# Patient Record
Sex: MALE | Race: WHITE | NOT HISPANIC OR LATINO | Employment: FULL TIME | URBAN - METROPOLITAN AREA
[De-identification: names, ages, dates, MRNs, and addresses within clinical notes are randomized per-mention and may not be internally consistent; named-entity substitution may affect disease eponyms.]

---

## 2020-09-24 ENCOUNTER — APPOINTMENT (OUTPATIENT)
Dept: GENERAL RADIOLOGY | Facility: HOSPITAL | Age: 25
End: 2020-09-24

## 2020-09-24 ENCOUNTER — HOSPITAL ENCOUNTER (EMERGENCY)
Facility: HOSPITAL | Age: 25
Discharge: HOME | End: 2020-09-24
Admitting: EMERGENCY MEDICINE

## 2020-09-24 VITALS
SYSTOLIC BLOOD PRESSURE: 120 MMHG | RESPIRATION RATE: 18 BRPM | OXYGEN SATURATION: 98 % | DIASTOLIC BLOOD PRESSURE: 68 MMHG | WEIGHT: 145.28 LBS | TEMPERATURE: 98 F | HEART RATE: 82 BPM | BODY MASS INDEX: 20.34 KG/M2 | HEIGHT: 71 IN

## 2020-09-24 DIAGNOSIS — M54.2 NECK PAIN: Primary | ICD-10-CM

## 2020-09-24 DIAGNOSIS — M89.8X1 PAIN OF LEFT SCAPULA: ICD-10-CM

## 2020-09-24 DIAGNOSIS — S16.1XXA STRAIN OF NECK MUSCLE, INITIAL ENCOUNTER: ICD-10-CM

## 2020-09-24 DIAGNOSIS — S40.012A CONTUSION OF LEFT SCAPULA, INITIAL ENCOUNTER: ICD-10-CM

## 2020-09-24 PROCEDURE — 99283 EMERGENCY DEPT VISIT LOW MDM: CPT

## 2020-09-24 PROCEDURE — 96372 THER/PROPH/DIAG INJ SC/IM: CPT

## 2020-09-24 PROCEDURE — 73010 X-RAY EXAM OF SHOULDER BLADE: CPT

## 2020-09-24 PROCEDURE — 25010000002 KETOROLAC TROMETHAMINE PER 15 MG: Performed by: PHYSICIAN ASSISTANT

## 2020-09-24 PROCEDURE — 72050 X-RAY EXAM NECK SPINE 4/5VWS: CPT

## 2020-09-24 RX ORDER — METHOCARBAMOL 750 MG/1
750 TABLET, FILM COATED ORAL 3 TIMES DAILY PRN
Qty: 15 TABLET | Refills: 0 | OUTPATIENT
Start: 2020-09-24 | End: 2023-01-18

## 2020-09-24 RX ORDER — LIDOCAINE 50 MG/G
1 PATCH TOPICAL ONCE
Status: DISCONTINUED | OUTPATIENT
Start: 2020-09-24 | End: 2020-09-24 | Stop reason: HOSPADM

## 2020-09-24 RX ORDER — KETOROLAC TROMETHAMINE 30 MG/ML
30 INJECTION, SOLUTION INTRAMUSCULAR; INTRAVENOUS ONCE
Status: COMPLETED | OUTPATIENT
Start: 2020-09-24 | End: 2020-09-24

## 2020-09-24 RX ORDER — NAPROXEN 500 MG/1
500 TABLET ORAL 2 TIMES DAILY WITH MEALS
Qty: 20 TABLET | Refills: 0 | OUTPATIENT
Start: 2020-09-24 | End: 2023-01-18

## 2020-09-24 RX ORDER — METHOCARBAMOL 750 MG/1
750 TABLET, FILM COATED ORAL ONCE
Status: COMPLETED | OUTPATIENT
Start: 2020-09-24 | End: 2020-09-24

## 2020-09-24 RX ORDER — LIDOCAINE 50 MG/G
1 PATCH TOPICAL EVERY 24 HOURS
Qty: 6 EACH | Refills: 0 | OUTPATIENT
Start: 2020-09-24 | End: 2023-01-18

## 2020-09-24 RX ADMIN — METHOCARBAMOL TABLETS 750 MG: 750 TABLET, COATED ORAL at 07:26

## 2020-09-24 RX ADMIN — KETOROLAC TROMETHAMINE 30 MG: 30 INJECTION, SOLUTION INTRAMUSCULAR at 07:26

## 2020-09-24 RX ADMIN — LIDOCAINE 1 PATCH: 50 PATCH TOPICAL at 07:26

## 2020-09-24 NOTE — DISCHARGE INSTRUCTIONS
Use naproxen and Robaxin as needed for pain.  Do not mix naproxen with other NSAID such as ibuprofen diclofenac or Aleve.  You may also use lidocaine patch as needed for pain.  Do not apply heating pad over this patch.    Follow-up with your primary care provider in 3-5 days.  If you do not have a primary care provider call [phone] for help in finding one, or you may follow up with [facility] at [phone].    Return to ED for any new or worsening symptoms.    No heavy lifting for the next 5 days.

## 2020-09-24 NOTE — ED PROVIDER NOTES
Subjective   Patient is a 24-year-old male [facility] significant for arthritis who presents with complaints of left scapula and neck pain started around 6:30 AM at work.  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  Patient states healing at bedside the patient mostly along the left side.  Patient denies hitting his head or LOC at time of the incident.  Patient is complaining of neck and left scapula pain that radiates into his left arm at times.  Patient does report some paresthesias of his right arm but denies any weakness.  Patient describes the pain as an intermittent sharp stabbing type pain that he rates an 8/10 severity.  Patient states the pain is worse with certain movements better with rest.  Patient denies any recent surgeries to the area.  Patient also denies taking any medications.  Patient denies any headache, tenderness or nasal drainage facial droop, chest pain or shortness of breath          Review of Systems   Constitutional: Negative.    Respiratory: Negative.    Cardiovascular: Negative.    Gastrointestinal: Negative for abdominal pain, nausea and vomiting.   Musculoskeletal: Positive for arthralgias, myalgias and neck pain. Negative for gait problem, joint swelling and neck stiffness.   Skin: Negative.    Neurological: Negative.        History reviewed. No pertinent past medical history.    Allergies   Allergen Reactions   • Erythromycin Hives       History reviewed. No pertinent surgical history.    No family history on file.    Social History     Socioeconomic History   • Marital status: Single     Spouse name: Not on file   • Number of children: Not on file   • Years of education: Not on file   • Highest education level: Not on file           Objective   Physical Exam  Vitals signs and nursing note reviewed.   Constitutional:       General: He is not in acute distress.     Appearance: He is  "well-developed. He is not ill-appearing, toxic-appearing or diaphoretic.   HENT:      Head: Normocephalic and atraumatic.      Mouth/Throat:      Pharynx: No oropharyngeal exudate.   Eyes:      General: No scleral icterus.     Pupils: Pupils are equal, round, and reactive to light.   Neck:      Musculoskeletal: Decreased range of motion. Pain with movement and muscular tenderness present. No edema, erythema or neck rigidity.      Trachea: Trachea normal.   Cardiovascular:      Rate and Rhythm: Normal rate and regular rhythm.      Heart sounds: Normal heart sounds. No murmur. No friction rub. No gallop.    Pulmonary:      Effort: Pulmonary effort is normal. No respiratory distress.      Breath sounds: Normal breath sounds. No stridor. No wheezing, rhonchi or rales.   Chest:      Chest wall: No tenderness.   Musculoskeletal:      Left shoulder: He exhibits decreased range of motion and tenderness. He exhibits no bony tenderness, no swelling, no crepitus, no deformity, no laceration, no pain, no spasm, normal pulse and normal strength.        Arms:       Comments: Radial medial ulnar and axillary nerve intact normal sensation and strength bilaterally.  Peripheral pulses are intact and compartments are soft of upper extremities bilaterally     Lymphadenopathy:      Cervical: No cervical adenopathy.   Skin:     General: Skin is warm.      Capillary Refill: Capillary refill takes less than 2 seconds.      Findings: No bruising or rash.   Neurological:      General: No focal deficit present.      Mental Status: He is alert and oriented to person, place, and time.      Cranial Nerves: No cranial nerve deficit.      Sensory: No sensory deficit.   Psychiatric:         Mood and Affect: Mood normal.         Behavior: Behavior normal.         Procedures           ED Course    /68 (BP Location: Right arm, Patient Position: Sitting)   Pulse 82   Temp 98 °F (36.7 °C) (Oral)   Resp 18   Ht 180.3 cm (71\")   Wt 65.9 kg (145 " lb 4.5 oz)   SpO2 98%   BMI 20.26 kg/m²   Medications   lidocaine (LIDODERM) 5 % 1 patch (1 patch Transdermal Medication Applied 9/24/20 0726)   ketorolac (TORADOL) injection 30 mg (30 mg Intramuscular Given 9/24/20 0726)   methocarbamol (ROBAXIN) tablet 750 mg (750 mg Oral Given 9/24/20 0726)     Labs Reviewed - No data to display  Xr Spine Cervical Complete 4 Or 5 View    Result Date: 9/24/2020  Normal radiographic exam of the cervical spine.  Electronically Signed By-Samra Benedict MD On:9/24/2020 7:52 AM This report was finalized on [id number] by  Samra Benedict MD.    Xr Scapula Left    Result Date: 9/24/2020  1.No acute osseous abnormality.  Electronically Signed By-Ruiz Atkins On:9/24/2020 7:51 AM This report was finalized on [id number] by  Ruiz Atkins, .                                           MDM  Number of Diagnoses or Management Options  Contusion of left scapula, initial encounter:   Neck pain:   Pain of left scapula:   Strain of neck muscle, initial encounter:   Diagnosis management comments: Chart Review:  Comorbidity: Arthritis  Differentials: Sprain strain fracture;this list is not all inclusive and does not constitute the entirety of considered causes  Imaging: Was interpreted by physician and reviewed by myself:  Xr Spine Cervical Complete 4 Or 5 View  Result Date: 9/24/2020  Normal radiographic exam of the cervical spine.  Electronically Signed By-Samra Benedict MD On:9/24/2020 7:52 AM This report was finalized on [id number] by  Samra Benedict MD.    Xr Scapula Left  Result Date: 9/24/2020  1.No acute osseous abnormality.  Electronically Signed ByAlbino Atkins On:9/24/2020 7:51 AM This report was finalized on [id number] by  Ruiz Atkins, .    Disposition/Treatment:  Appropriate PPE was worn during exam and throughout all encounters with the patient.  While in the ED patient was afebrile.  Nontoxic there are no signs of secondary infections or lacerations noted.  He was given Toradol Robaxin  and lidocaine patch for his pain.  Upon reassessment patient is resting quietly does report improvement of his pain.  X-ray of cervical spine and scapula showed no acute osseous abnormalities as above.  Patient symptoms likely secondary to contusion and strain.  Patient be given naproxen Robaxin and lidocaine patches for home he was advised to do no heavy lifting for the next 5 days he was also given a work note. Findings were discussed with the patient  who voiced understanding of discharge instructions along with signs and symptoms requiring return to the ED.  Upon discharged patient was in stable condition with followup for a revaluation.        Amount and/or Complexity of Data Reviewed  Tests in the radiology section of CPT®: reviewed        Final diagnoses:   Neck pain   Strain of neck muscle, initial encounter   Pain of left scapula   Contusion of left scapula, initial encounter            Maru Hill PA  09/24/20 0819

## 2021-01-19 ENCOUNTER — HOSPITAL ENCOUNTER (EMERGENCY)
Facility: HOSPITAL | Age: 26
Discharge: HOME | End: 2021-01-19
Attending: EMERGENCY MEDICINE | Admitting: EMERGENCY MEDICINE

## 2021-01-19 VITALS
SYSTOLIC BLOOD PRESSURE: 132 MMHG | BODY MASS INDEX: 21 KG/M2 | TEMPERATURE: 98.7 F | RESPIRATION RATE: 16 BRPM | OXYGEN SATURATION: 98 % | DIASTOLIC BLOOD PRESSURE: 79 MMHG | HEART RATE: 89 BPM | WEIGHT: 150 LBS | HEIGHT: 71 IN

## 2021-01-19 DIAGNOSIS — F22 PARANOIA (HCC): Primary | ICD-10-CM

## 2021-01-19 DIAGNOSIS — F15.10 METHAMPHETAMINE USE (HCC): ICD-10-CM

## 2021-01-19 LAB
ALBUMIN SERPL-MCNC: 5.2 G/DL (ref 3.5–5.2)
ALBUMIN/GLOB SERPL: 2 G/DL
ALP SERPL-CCNC: 57 U/L (ref 39–117)
ALT SERPL W P-5'-P-CCNC: 37 U/L (ref 1–41)
AMPHET+METHAMPHET UR QL: POSITIVE
ANION GAP SERPL CALCULATED.3IONS-SCNC: 14 MMOL/L (ref 5–15)
APAP SERPL-MCNC: <5 MCG/ML (ref 0–30)
AST SERPL-CCNC: 51 U/L (ref 1–40)
B PARAPERT DNA SPEC QL NAA+PROBE: NOT DETECTED
B PERT DNA SPEC QL NAA+PROBE: NOT DETECTED
BARBITURATES UR QL SCN: NEGATIVE
BASOPHILS # BLD AUTO: 0.1 10*3/MM3 (ref 0–0.2)
BASOPHILS NFR BLD AUTO: 0.6 % (ref 0–1.5)
BENZODIAZ UR QL SCN: NEGATIVE
BILIRUB SERPL-MCNC: 1.2 MG/DL (ref 0–1.2)
BUN SERPL-MCNC: 14 MG/DL (ref 6–20)
BUN/CREAT SERPL: 17.3 (ref 7–25)
C PNEUM DNA NPH QL NAA+NON-PROBE: NOT DETECTED
CALCIUM SPEC-SCNC: 9.9 MG/DL (ref 8.6–10.5)
CANNABINOIDS SERPL QL: POSITIVE
CHLORIDE SERPL-SCNC: 96 MMOL/L (ref 98–107)
CK SERPL-CCNC: 491 U/L (ref 20–200)
CO2 SERPL-SCNC: 26 MMOL/L (ref 22–29)
COCAINE UR QL: NEGATIVE
CREAT SERPL-MCNC: 0.81 MG/DL (ref 0.76–1.27)
DEPRECATED RDW RBC AUTO: 40.7 FL (ref 37–54)
EOSINOPHIL # BLD AUTO: 0.2 10*3/MM3 (ref 0–0.4)
EOSINOPHIL NFR BLD AUTO: 2 % (ref 0.3–6.2)
ERYTHROCYTE [DISTWIDTH] IN BLOOD BY AUTOMATED COUNT: 12.5 % (ref 12.3–15.4)
ETHANOL UR QL: <0.01 %
FLUAV SUBTYP SPEC NAA+PROBE: NOT DETECTED
FLUBV RNA ISLT QL NAA+PROBE: NOT DETECTED
GFR SERPL CREATININE-BSD FRML MDRD: 115 ML/MIN/1.73
GLOBULIN UR ELPH-MCNC: 2.6 GM/DL
GLUCOSE SERPL-MCNC: 112 MG/DL (ref 65–99)
HADV DNA SPEC NAA+PROBE: NOT DETECTED
HCOV 229E RNA SPEC QL NAA+PROBE: NOT DETECTED
HCOV HKU1 RNA SPEC QL NAA+PROBE: NOT DETECTED
HCOV NL63 RNA SPEC QL NAA+PROBE: NOT DETECTED
HCOV OC43 RNA SPEC QL NAA+PROBE: NOT DETECTED
HCT VFR BLD AUTO: 41.6 % (ref 37.5–51)
HGB BLD-MCNC: 14.7 G/DL (ref 13–17.7)
HMPV RNA NPH QL NAA+NON-PROBE: NOT DETECTED
HOLD SPECIMEN: NORMAL
HPIV1 RNA SPEC QL NAA+PROBE: NOT DETECTED
HPIV2 RNA SPEC QL NAA+PROBE: NOT DETECTED
HPIV3 RNA NPH QL NAA+PROBE: NOT DETECTED
HPIV4 P GENE NPH QL NAA+PROBE: NOT DETECTED
LYMPHOCYTES # BLD AUTO: 1.4 10*3/MM3 (ref 0.7–3.1)
LYMPHOCYTES NFR BLD AUTO: 13.9 % (ref 19.6–45.3)
M PNEUMO IGG SER IA-ACNC: NOT DETECTED
MAGNESIUM SERPL-MCNC: 1.8 MG/DL (ref 1.6–2.6)
MCH RBC QN AUTO: 33.1 PG (ref 26.6–33)
MCHC RBC AUTO-ENTMCNC: 35.4 G/DL (ref 31.5–35.7)
MCV RBC AUTO: 93.4 FL (ref 79–97)
METHADONE UR QL SCN: NEGATIVE
MONOCYTES # BLD AUTO: 0.8 10*3/MM3 (ref 0.1–0.9)
MONOCYTES NFR BLD AUTO: 8.4 % (ref 5–12)
NEUTROPHILS NFR BLD AUTO: 7.3 10*3/MM3 (ref 1.7–7)
NEUTROPHILS NFR BLD AUTO: 75.1 % (ref 42.7–76)
NRBC BLD AUTO-RTO: 0.2 /100 WBC (ref 0–0.2)
OPIATES UR QL: NEGATIVE
OXYCODONE UR QL SCN: NEGATIVE
PLATELET # BLD AUTO: 192 10*3/MM3 (ref 140–450)
PMV BLD AUTO: 7.6 FL (ref 6–12)
POTASSIUM SERPL-SCNC: 3.9 MMOL/L (ref 3.5–5.2)
PROT SERPL-MCNC: 7.8 G/DL (ref 6–8.5)
RBC # BLD AUTO: 4.45 10*6/MM3 (ref 4.14–5.8)
RHINOVIRUS RNA SPEC NAA+PROBE: NOT DETECTED
RSV RNA NPH QL NAA+NON-PROBE: NOT DETECTED
SALICYLATES SERPL-MCNC: <0.3 MG/DL
SARS-COV-2 RNA NPH QL NAA+NON-PROBE: NOT DETECTED
SODIUM SERPL-SCNC: 136 MMOL/L (ref 136–145)
TSH SERPL DL<=0.05 MIU/L-ACNC: 3.93 UIU/ML (ref 0.27–4.2)
WBC # BLD AUTO: 9.7 10*3/MM3 (ref 3.4–10.8)
WHOLE BLOOD HOLD SPECIMEN: NORMAL

## 2021-01-19 PROCEDURE — 80307 DRUG TEST PRSMV CHEM ANLYZR: CPT | Performed by: EMERGENCY MEDICINE

## 2021-01-19 PROCEDURE — 85025 COMPLETE CBC W/AUTO DIFF WBC: CPT | Performed by: EMERGENCY MEDICINE

## 2021-01-19 PROCEDURE — 0202U NFCT DS 22 TRGT SARS-COV-2: CPT | Performed by: EMERGENCY MEDICINE

## 2021-01-19 PROCEDURE — 80053 COMPREHEN METABOLIC PANEL: CPT | Performed by: EMERGENCY MEDICINE

## 2021-01-19 PROCEDURE — 80179 DRUG ASSAY SALICYLATE: CPT | Performed by: EMERGENCY MEDICINE

## 2021-01-19 PROCEDURE — 82550 ASSAY OF CK (CPK): CPT | Performed by: EMERGENCY MEDICINE

## 2021-01-19 PROCEDURE — 80143 DRUG ASSAY ACETAMINOPHEN: CPT | Performed by: EMERGENCY MEDICINE

## 2021-01-19 PROCEDURE — 83735 ASSAY OF MAGNESIUM: CPT | Performed by: EMERGENCY MEDICINE

## 2021-01-19 PROCEDURE — 82077 ASSAY SPEC XCP UR&BREATH IA: CPT | Performed by: EMERGENCY MEDICINE

## 2021-01-19 PROCEDURE — 84443 ASSAY THYROID STIM HORMONE: CPT | Performed by: EMERGENCY MEDICINE

## 2021-01-19 PROCEDURE — 99283 EMERGENCY DEPT VISIT LOW MDM: CPT

## 2021-01-19 RX ORDER — SODIUM CHLORIDE 0.9 % (FLUSH) 0.9 %
10 SYRINGE (ML) INJECTION AS NEEDED
Status: DISCONTINUED | OUTPATIENT
Start: 2021-01-19 | End: 2021-01-19 | Stop reason: HOSPADM

## 2021-01-19 RX ADMIN — SODIUM CHLORIDE 1000 ML: 9 INJECTION, SOLUTION INTRAVENOUS at 15:14

## 2021-01-19 NOTE — ED NOTES
1513- Called [facility], spoke to Gale in intake. She took the pts information and asked me to fax a facesheet and clinical. She put the pt in the que and estimated about an hour.      Anna Marie Walters  01/19/21 1512

## 2021-01-19 NOTE — ED NOTES
1458- [facility] called for pts visit, she is sending us what she has.      Anna Marie Walters  01/19/21 1458

## 2021-01-19 NOTE — ED NOTES
Pt brought in by EMS from [place] c/o hallucinations and seeing people follow him. Pt states he used meth and drank alcohol yesterday evening. Pt states he ran from his apartment last night and was stopped by police and taken to [facility] early this am. Pt went to [facility] after leaving [facility] and was picked up there by EMS.Pt states he is seeing the same two people that are following him and trying to harm him.     Blanca Mayes, RN  01/19/21 1437

## 2021-01-20 NOTE — ED PROVIDER NOTES
Subjective   Chief complaint hallucinations paranoia    History of present illness 25-year-old male who reported did methamphetamine yesterday who states that he was seeing people who are after him and are following him.  He apparently went to [facility] last night and was released.  He had referral for outpatient treatment.  The patient apparently was at a restaurant in [place] and was paranoid and acting unusual so EMS was called and they told him he had to go to the hospital and I brought him to [facility].  He denies any complaints of pain he denies any headache chest pain or shortness of breath fever chills sweats no recent injury no recent illness no recent procedures.  He denies IV drug use.  He states he is not suicidal or homicidal.  Symptoms ongoing since last night nothing makes it better or worse he thinks people are following him everywhere he goes he seen the same people.          Review of Systems   Constitutional: Negative for chills and fever.   HENT: Negative for congestion and sinus pressure.    Eyes: Negative for photophobia and visual disturbance.   Respiratory: Negative for chest tightness and shortness of breath.    Cardiovascular: Negative for chest pain and leg swelling.   Gastrointestinal: Negative for abdominal pain and vomiting.   Genitourinary: Negative for difficulty urinating and dysuria.   Musculoskeletal: Negative for arthralgias and back pain.   Skin: Negative for color change and pallor.   Neurological: Negative for dizziness, light-headedness and headaches.   Psychiatric/Behavioral: Positive for hallucinations. Negative for agitation and behavioral problems. The patient is nervous/anxious.        Past Medical History:   Diagnosis Date   • Ankylosing spondylitis (CMS/[facility])    • RA (rheumatoid arthritis) (CMS/[facility])        Allergies   Allergen Reactions   • Erythromycin Hives       History reviewed. No pertinent surgical history.    History reviewed.  No pertinent family history.    Social History     Socioeconomic History   • Marital status: Single     Spouse name: Not on file   • Number of children: Not on file   • Years of education: Not on file   • Highest education level: Not on file   Tobacco Use   • Smoking status: Current Every Day Smoker     Packs/day: 1.00     Types: Cigarettes   • Smokeless tobacco: Current User   • Tobacco comment: vape as well   Substance and Sexual Activity   • Alcohol use: Yes   • Drug use: Yes     Types: Methamphetamines     Comment: meth last night, cocaine, weed, mushrooms, acid     Patient is not currently on any medications.  Social history he does smoke he does use alcohol he does use meth    Objective   Physical Exam  25-year-old male awake alert no acute distress HEENT extraocular muscles intact pupils equal round reactive mouth is clear there is no photophobia neck supple no meningeal signs no adenopathy no JVD no bruits lungs clear no retraction no use of accessories.  Heart regular without murmur rub abdomen soft without tenderness good bowel sounds no peritoneal findings or pulsatile masses.  Extremities pulses are equal throughout upper and lower extremities no edema cords or Homans' sign no cellulitic changes there is no rash anywhere no track marks he is awake alert orientated x4 no facial asymmetry normal speech no drift the arms legs he walks without difficulty no focal findings no rash no petechiae no purpura  Procedures           ED Course      Results for orders placed or performed during the hospital encounter of 01/19/21   Respiratory Panel PCR w/COVID-19(SARS-CoV-2) BONG/AMANUEL/CHARLA/PAD/COR/MAD/GLADIS In-House, NP Swab in UTM/VTM, 3-4 HR TAT - Swab, Nasopharynx    Specimen: Nasopharynx; Swab   Result Value Ref Range    ADENOVIRUS, PCR Not Detected Not Detected    Coronavirus 229E Not Detected Not Detected    Coronavirus HKU1 Not Detected Not Detected    Coronavirus NL63 Not Detected Not Detected    Coronavirus OC43  Not Detected Not Detected    COVID19 Not Detected Not Detected - Ref. Range    Human Metapneumovirus Not Detected Not Detected    Human Rhinovirus/Enterovirus Not Detected Not Detected    Influenza A PCR Not Detected Not Detected    Influenza B PCR Not Detected Not Detected    Parainfluenza Virus 1 Not Detected Not Detected    Parainfluenza Virus 2 Not Detected Not Detected    Parainfluenza Virus 3 Not Detected Not Detected    Parainfluenza Virus 4 Not Detected Not Detected    RSV, PCR Not Detected Not Detected    Bordetella pertussis pcr Not Detected Not Detected    Bordetella parapertussis PCR Not Detected Not Detected    Chlamydophila pneumoniae PCR Not Detected Not Detected    Mycoplasma pneumo by PCR Not Detected Not Detected   Comprehensive Metabolic Panel    Specimen: Blood   Result Value Ref Range    Glucose 112 (H) 65 - 99 mg/dL    BUN 14 6 - 20 mg/dL    Creatinine 0.81 0.76 - 1.27 mg/dL    Sodium 136 136 - 145 mmol/L    Potassium 3.9 3.5 - 5.2 mmol/L    Chloride 96 (L) 98 - 107 mmol/L    CO2 26.0 22.0 - 29.0 mmol/L    Calcium 9.9 8.6 - 10.5 mg/dL    Total Protein 7.8 6.0 - 8.5 g/dL    Albumin 5.20 3.50 - 5.20 g/dL    ALT (SGPT) 37 1 - 41 U/L    AST (SGOT) 51 (H) 1 - 40 U/L    Alkaline Phosphatase 57 39 - 117 U/L    Total Bilirubin 1.2 0.0 - 1.2 mg/dL    eGFR Non African Amer 115 >60 mL/min/1.73    Globulin 2.6 gm/dL    A/G Ratio 2.0 g/dL    BUN/Creatinine Ratio 17.3 7.0 - 25.0    Anion Gap 14.0 5.0 - 15.0 mmol/L   CK    Specimen: Blood   Result Value Ref Range    Creatine Kinase 491 (H) 20 - 200 U/L   Magnesium    Specimen: Blood   Result Value Ref Range    Magnesium 1.8 1.6 - 2.6 mg/dL   TSH    Specimen: Blood   Result Value Ref Range    TSH 3.930 0.270 - 4.200 uIU/mL   Urine Drug Screen - Urine, Clean Catch    Specimen: Urine, Clean Catch   Result Value Ref Range    Amphet/Methamphet, Screen Positive (A) Negative    Barbiturates Screen, Urine Negative Negative    Benzodiazepine Screen, Urine Negative  Negative    Cocaine Screen, Urine Negative Negative    Opiate Screen Negative Negative    THC, Screen, Urine Positive (A) Negative    Methadone Screen, Urine Negative Negative    Oxycodone Screen, Urine Negative Negative   Ethanol    Specimen: Blood   Result Value Ref Range    Ethanol % <0.010 %   Acetaminophen Level    Specimen: Blood   Result Value Ref Range    Acetaminophen <5.0 0.0 - 30.0 mcg/mL   Salicylate Level    Specimen: Blood   Result Value Ref Range    Salicylate <0.3 <=30.0 mg/dL   CBC Auto Differential    Specimen: Blood   Result Value Ref Range    WBC 9.70 3.40 - 10.80 10*3/mm3    RBC 4.45 4.14 - 5.80 10*6/mm3    Hemoglobin 14.7 13.0 - 17.7 g/dL    Hematocrit 41.6 37.5 - 51.0 %    MCV 93.4 79.0 - 97.0 fL    MCH 33.1 (H) 26.6 - 33.0 pg    MCHC 35.4 31.5 - 35.7 g/dL    RDW 12.5 12.3 - 15.4 %    RDW-SD 40.7 37.0 - 54.0 fl    MPV 7.6 6.0 - 12.0 fL    Platelets 192 140 - 450 10*3/mm3    Neutrophil % 75.1 42.7 - 76.0 %    Lymphocyte % 13.9 (L) 19.6 - 45.3 %    Monocyte % 8.4 5.0 - 12.0 %    Eosinophil % 2.0 0.3 - 6.2 %    Basophil % 0.6 0.0 - 1.5 %    Neutrophils, Absolute 7.30 (H) 1.70 - 7.00 10*3/mm3    Lymphocytes, Absolute 1.40 0.70 - 3.10 10*3/mm3    Monocytes, Absolute 0.80 0.10 - 0.90 10*3/mm3    Eosinophils, Absolute 0.20 0.00 - 0.40 10*3/mm3    Basophils, Absolute 0.10 0.00 - 0.20 10*3/mm3    nRBC 0.2 0.0 - 0.2 /100 WBC   Light Blue Top   Result Value Ref Range    Extra Tube hold for add-on    Gold Top - SST   Result Value Ref Range    Extra Tube Hold for add-ons.      No radiology results for the last day  Medications   sodium chloride 0.9 % flush 10 mL (has no administration in time range)   sodium chloride 0.9 % bolus 1,000 mL (0 mL Intravenous Stopped 1/19/21 1544)                                            MDM  Number of Diagnoses or Management Options  Methamphetamine use (CMS/HCC):   Paranoia (CMS/HCC):   Diagnosis management comments: Medical decision making.  Patient IV normal saline x1 L  placed on a monitor and had the above exam evaluation CBC unremarkable alcohol Tylenol aspirin level negative drug screen showed amphetamines and THC chemistries unremarkable.  Respiratory panel was negative COVID-19 negative.  Patient at [facility] consultation.  He will have intensive outpatient therapy for his paranoia and drug use.  He is not suicidal homicidal repeat exam is resting comfortably.  Awake alert orientated x4 with no focal deficit unchanged from previous heart rate now 100 abdomen soft without tenderness I see no evidence of infection or sepsis by examination.  No hear murmur suggest endocarditis he does not use IV drugs.  No evidence just DVT or pulmonary embolism.  The patient looks well he is very appropriate acting not suicidal or homicidal desires to go home will be discharged home for outpatient follow-up and treatment.  Stressed the importance of this low verbal written instructions provided       Amount and/or Complexity of Data Reviewed  Clinical lab tests: reviewed        Final diagnoses:   Paranoia (CMS/[facility])   Methamphetamine use (CMS/[facility])            Nicho Rowe MD  01/19/21 2040

## 2021-01-20 NOTE — DISCHARGE INSTRUCTIONS
Follow-up Per [facility]'s recommendations.  Stop using methamphetamine and alcohol.  Return for suicidal ideations fever vomiting rash shortness of breath or any other new or worsening problems or concerns

## 2023-05-23 ENCOUNTER — APPOINTMENT (OUTPATIENT)
Dept: GENERAL RADIOLOGY | Facility: HOSPITAL | Age: 28
End: 2023-05-23
Payer: COMMERCIAL

## 2023-05-23 ENCOUNTER — HOSPITAL ENCOUNTER (EMERGENCY)
Facility: HOSPITAL | Age: 28
Discharge: HOME | End: 2023-05-23
Attending: EMERGENCY MEDICINE | Admitting: EMERGENCY MEDICINE
Payer: COMMERCIAL

## 2023-05-23 VITALS
TEMPERATURE: 98.7 F | DIASTOLIC BLOOD PRESSURE: 62 MMHG | HEART RATE: 93 BPM | RESPIRATION RATE: 18 BRPM | BODY MASS INDEX: 26.6 KG/M2 | SYSTOLIC BLOOD PRESSURE: 119 MMHG | WEIGHT: 190.04 LBS | OXYGEN SATURATION: 99 % | HEIGHT: 71 IN

## 2023-05-23 DIAGNOSIS — M25.511 ACUTE PAIN OF RIGHT SHOULDER: Primary | ICD-10-CM

## 2023-05-23 PROCEDURE — 96372 THER/PROPH/DIAG INJ SC/IM: CPT

## 2023-05-23 PROCEDURE — 73030 X-RAY EXAM OF SHOULDER: CPT

## 2023-05-23 PROCEDURE — 25010000002 KETOROLAC TROMETHAMINE PER 15 MG: Performed by: PHYSICIAN ASSISTANT

## 2023-05-23 PROCEDURE — 99283 EMERGENCY DEPT VISIT LOW MDM: CPT

## 2023-05-23 RX ORDER — KETOROLAC TROMETHAMINE 30 MG/ML
30 INJECTION, SOLUTION INTRAMUSCULAR; INTRAVENOUS ONCE
Status: COMPLETED | OUTPATIENT
Start: 2023-05-23 | End: 2023-05-23

## 2023-05-23 RX ORDER — METHOCARBAMOL 750 MG/1
750 TABLET, FILM COATED ORAL ONCE
Status: COMPLETED | OUTPATIENT
Start: 2023-05-23 | End: 2023-05-23

## 2023-05-23 RX ORDER — LIDOCAINE 50 MG/G
1 PATCH TOPICAL EVERY 24 HOURS
Qty: 6 EACH | Refills: 0 | Status: SHIPPED | OUTPATIENT
Start: 2023-05-23

## 2023-05-23 RX ORDER — LIDOCAINE 50 MG/G
1 PATCH TOPICAL
Status: DISCONTINUED | OUTPATIENT
Start: 2023-05-23 | End: 2023-05-23 | Stop reason: HOSPADM

## 2023-05-23 RX ORDER — METHOCARBAMOL 750 MG/1
750 TABLET, FILM COATED ORAL 3 TIMES DAILY PRN
Qty: 15 TABLET | Refills: 0 | Status: SHIPPED | OUTPATIENT
Start: 2023-05-23

## 2023-05-23 RX ORDER — NAPROXEN 500 MG/1
500 TABLET ORAL 2 TIMES DAILY WITH MEALS
Qty: 20 TABLET | Refills: 0 | Status: SHIPPED | OUTPATIENT
Start: 2023-05-23

## 2023-05-23 RX ADMIN — KETOROLAC TROMETHAMINE 30 MG: 30 INJECTION, SOLUTION INTRAMUSCULAR at 16:27

## 2023-05-23 RX ADMIN — LIDOCAINE 1 PATCH: 50 PATCH TOPICAL at 16:27

## 2023-05-23 RX ADMIN — METHOCARBAMOL 750 MG: 750 TABLET ORAL at 16:27

## 2023-05-23 NOTE — DISCHARGE INSTRUCTIONS
Take medications as directed.  Do not mix naproxen with other NSAIDs such as ibuprofen diclofenac or Aleve    No excessive lifting or overhead movement for the next 5 days.    If your pain continues follow-up with orthopedist as listed below.    Follow-up with your primary care provider in 3-5 days.  If you do not have a primary care provider call [phone] for help in finding one, or you may follow up with [facility] at [phone].    Return to ED for any new or worsening symptoms

## 2023-05-23 NOTE — ED PROVIDER NOTES
Subjective   History of Present Illness  Patient is a 27-year-old male presents to the ED with complaints of right shoulder pain for the past 3 days.  No significant injury to the area however patient does lift heavy objects at work says he is a [employer] .  Patient states the pain is worse with movement better with rest.  He denies any numbness or weakness of his upper extremity.  No prior surgeries to the area.  He describes it as constant achy type pain with intermittent sharp shooting pain depending on movement.  He has taken ibuprofen with some improvement of his pain.        Review of Systems   Constitutional: Negative.    HENT: Negative.    Respiratory: Negative.    Cardiovascular: Negative.    Gastrointestinal: Negative for abdominal pain, nausea and vomiting.   Musculoskeletal: Positive for arthralgias. Negative for back pain, joint swelling, neck pain and neck stiffness.   Skin: Negative.    Neurological: Negative.        Past Medical History:   Diagnosis Date   • Ankylosing spondylitis    • Bipolar 1 disorder    • RA (rheumatoid arthritis)        Allergies   Allergen Reactions   • Erythromycin Hives       History reviewed. No pertinent surgical history.    History reviewed. No pertinent family history.    Social History     Socioeconomic History   • Marital status: Single   Tobacco Use   • Smoking status: Every Day     Packs/day: 1.00     Types: Cigarettes   • Smokeless tobacco: Current   • Tobacco comments:     vape as well   Vaping Use   • Vaping Use: Never used   Substance and Sexual Activity   • Alcohol use: Not Currently   • Drug use: Not Currently     Comment: 1.5 yr. clean   • Sexual activity: Defer           Objective   Physical Exam  Vitals and nursing note reviewed.   Constitutional:       General: He is not in acute distress.     Appearance: Normal appearance. He is well-developed. He is not ill-appearing, toxic-appearing or diaphoretic.   HENT:      Head: Normocephalic and atraumatic.       Mouth/Throat:      Mouth: Mucous membranes are moist.      Pharynx: Oropharynx is clear.   Eyes:      General: No scleral icterus.     Extraocular Movements: Extraocular movements intact.      Pupils: Pupils are equal, round, and reactive to light.   Cardiovascular:      Rate and Rhythm: Normal rate and regular rhythm.      Pulses: Normal pulses.      Heart sounds: No murmur heard.    No friction rub. No gallop.   Pulmonary:      Effort: Pulmonary effort is normal. No tachypnea, accessory muscle usage or respiratory distress.      Breath sounds: Normal breath sounds. No stridor. No decreased breath sounds, wheezing, rhonchi or rales.   Chest:      Chest wall: No mass, deformity, tenderness or crepitus.   Musculoskeletal:      Right shoulder: Tenderness present. No swelling or deformity. Decreased range of motion. Normal strength. Normal pulse.      Left shoulder: Normal.      Right upper arm: Normal.      Right elbow: Normal.      Right forearm: Normal.      Right wrist: Normal.      Right hand: Normal.      Cervical back: Normal range of motion. No rigidity or bony tenderness. No pain with movement. Normal range of motion.      Comments: Peripheral pulses intact compartments are soft of bilateral upper extremities.    Right shoulder: There is no erythema induration or warmth noted.  The patient has slight decreased range of motion about the shoulder with negative drop-arm test secondary to pain.  4/5 strength including flexion, extension, abduction, adduction and internal and external rotation.  The patient is neurovascularly intact.  The patient has intact radial medial ulnar and axillary nerves   Skin:     General: Skin is warm.      Capillary Refill: Capillary refill takes less than 2 seconds.      Findings: No rash.   Neurological:      Mental Status: He is alert and oriented to person, place, and time.   Psychiatric:         Mood and Affect: Mood normal.         Behavior: Behavior normal.  "        Procedures           ED Course    /62 (BP Location: Left arm, Patient Position: Sitting)   Pulse 93   Temp 98.7 °F (37.1 °C) (Oral)   Resp 18   Ht 180.3 cm (71\")   Wt 86.2 kg (190 lb 0.6 oz)   SpO2 99%   BMI 26.50 kg/m²   Medications   lidocaine (LIDODERM) 5 % 1 patch (1 patch Transdermal Medication Applied 5/23/23 1627)   ketorolac (TORADOL) injection 30 mg (30 mg Intramuscular Given 5/23/23 1627)   methocarbamol (ROBAXIN) tablet 750 mg (750 mg Oral Given 5/23/23 1627)     Labs Reviewed - No data to display  XR Shoulder 2+ View Right    Result Date: 5/23/2023  1.No acute fractures or dislocations. Electronically Signed: Christiano Tarango  5/23/2023 4:51 PM EDT  Workstation ID: [id number]                                           Medical Decision Making  Chart Review: Urgent care note reviewed from 1/18/2023 presented with symptoms including cough congestion bilateral ear pain was started on cefdinir and had a cerumen impaction removed.    Comorbidity: As per past medical history  Differentials: Fracture dislocation contusion sprain strain     ;this list is not all inclusive and does not constitute the entirety of considered causes  Radiology: My interpretation right shoulder x-ray shows no acute osseous abnormality or dislocation correlated with radiologist interpretation as below  XR Shoulder 2+ View Right   Final Result    1.No acute fractures or dislocations.        Electronically Signed: Christiano Tarango      5/23/2023 4:51 PM EDT      Workstation ID: [id number]     Disposition/Treatment:  Appropriate PPE was worn during exam and throughout all encounters with the patient.  When the ED patient was afebrile and appeared nontoxic presented to the ED with complaints of right shoulder pain for the past few days.  There are no signs of septic joint including no redness swelling or warmth.  Range of motion intact with slightly decreased secondary to pain.  Images were obtained which showed no acute " fracture or dislocation as above.  Patient symptoms likely secondary to sprain.  He has no numbness or significant weakness.  Patient no neck pain.  Findings were discussed with the patient at bedside was advised to follow-up with orthopedist for continued management of his pain.  Patient be given naproxen and Robaxin along with lidocaine patches for home.  Patient was also given a work note.  Findings were discussed with patient at bedside voiced understanding of discharge along with signs and symptoms to return he was and agree with plan all questions were answered.    This document is intended for medical expert use only. Reading of this document by patients and/or patient's family without participating medical staff guidance may result in misinterpretation and unintended morbidity.  Any interpretation of such data is the responsibility of the patient and/or family member responsible for the patient in concert with their primary or specialist providers, not to be left for sources of online searches such as [employer] or similar queries. Relying on these approaches to knowledge may result in misinterpretation, misguided goals of care and even death should patients or family members try recommendations outside of the realm of professional medical care in a supervised inpatient environment.       Acute pain of right shoulder: acute illness or injury  Amount and/or Complexity of Data Reviewed  Radiology: ordered. Decision-making details documented in ED Course.      Risk  Prescription drug management.          Final diagnoses:   Acute pain of right shoulder       ED Disposition  ED Disposition     ED Disposition   Discharge    Condition   Stable    Comment   --             [facility] EMERGENCY DEPARTMENT  [address] [facility] [address] [phone]  Go to   If symptoms worsen    [facility] [place] [address] [phone]  Call   If you do not have a primary care  provider    Mando Connolly MD  [address] [place] [address] [phone]    Schedule an appointment as soon as possible for a visit   If your shoulder pain continues         Medication List      New Prescriptions    lidocaine 5 %  Commonly known as: LIDODERM  Place 1 patch on the skin as directed by provider Daily. Remove & Discard patch within 12 hours or as directed by MD     methocarbamol 750 MG tablet  Commonly known as: ROBAXIN  Take 1 tablet by mouth 3 (Three) Times a Day As Needed for Muscle Spasms.     naproxen 500 MG tablet  Commonly known as: NAPROSYN  Take 1 tablet by mouth 2 (Two) Times a Day With Meals.           Where to Get Your Medications      These medications were sent to [facility]/pharmacy #[id number] - [place], [place] - [address] [address] - [phone] [place] [phone]   [address] [facility] [place] [address]    Phone: [phone]   · lidocaine 5 %  · methocarbamol 750 MG tablet  · naproxen 500 MG tablet          Maru Hill PA  05/23/23 4966

## 2023-05-23 NOTE — Clinical Note
[facility] EMERGENCY DEPARTMENT  [address] [facility] [place] [address]  Phone: [phone]    Yudi Page was seen and treated in our emergency department on 5/23/2023.  He may return to work on 05/24/2023.  Light duty until 5/29/23 (including no lifting over 10 pounds or excessive overhead movement with the right arm) unless otherwise specified by orthopedist       Thank you for choosing [facility].    Maru Hill PA

## 2024-11-07 ENCOUNTER — HOSPITAL ENCOUNTER (EMERGENCY)
Facility: HOSPITAL | Age: 29
Discharge: HOME | End: 2024-11-07
Payer: OTHER MISCELLANEOUS

## 2024-11-07 ENCOUNTER — APPOINTMENT (OUTPATIENT)
Dept: GENERAL RADIOLOGY | Facility: HOSPITAL | Age: 29
End: 2024-11-07
Payer: OTHER MISCELLANEOUS

## 2024-11-07 VITALS
HEIGHT: 71 IN | TEMPERATURE: 97.8 F | BODY MASS INDEX: 27.3 KG/M2 | WEIGHT: 195 LBS | HEART RATE: 70 BPM | OXYGEN SATURATION: 100 % | DIASTOLIC BLOOD PRESSURE: 81 MMHG | SYSTOLIC BLOOD PRESSURE: 137 MMHG | RESPIRATION RATE: 16 BRPM

## 2024-11-07 DIAGNOSIS — M54.50 ACUTE RIGHT-SIDED LOW BACK PAIN WITHOUT SCIATICA: ICD-10-CM
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
## 2024-11-07 PROCEDURE — 99283 EMERGENCY DEPT VISIT LOW MDM: CPT

## 2024-11-07 PROCEDURE — 72110 X-RAY EXAM L-2 SPINE 4/>VWS: CPT

## 2024-11-07 PROCEDURE — 63710000001 PREDNISONE PER 5 MG: Performed by: NURSE PRACTITIONER

## 2024-11-07 RX ORDER — METHOCARBAMOL 500 MG/1
500 TABLET, FILM COATED ORAL 4 TIMES DAILY PRN
Qty: 20 TABLET | Refills: 0 | Status: SHIPPED | OUTPATIENT
Start: 2024-11-07

## 2024-11-07 RX ORDER — DICLOFENAC SODIUM 75 MG/1
75 TABLET, DELAYED RELEASE ORAL 2 TIMES DAILY PRN
Qty: 20 TABLET | Refills: 0 | Status: SHIPPED | OUTPATIENT
Start: 2024-11-07

## 2024-11-07 RX ORDER — IBUPROFEN 600 MG/1
600 TABLET, FILM COATED ORAL ONCE
Status: COMPLETED | OUTPATIENT
Start: 2024-11-07 | End: 2024-11-07

## 2024-11-07 RX ORDER — METHOCARBAMOL 500 MG/1
500 TABLET, FILM COATED ORAL ONCE
Status: COMPLETED | OUTPATIENT
Start: 2024-11-07 | End: 2024-11-07

## 2024-11-07 RX ORDER — PREDNISONE 20 MG/1
20 TABLET ORAL DAILY
Qty: 5 TABLET | Refills: 0 | Status: SHIPPED | OUTPATIENT
Start: 2024-11-07

## 2024-11-07 RX ADMIN — METHOCARBAMOL 500 MG: 500 TABLET ORAL at 14:03

## 2024-11-07 RX ADMIN — IBUPROFEN 600 MG: 600 TABLET, FILM COATED ORAL at 14:03

## 2024-11-07 RX ADMIN — PREDNISONE 60 MG: 50 TABLET ORAL at 14:03

## 2024-11-07 NOTE — Clinical Note
[facility] EMERGENCY DEPARTMENT  [address] [facility] [place] [address]  Phone: [phone]    Yudi Page was seen and treated in our emergency department on 11/7/2024.  He may return to work on 11/09/2024.         Thank you for choosing [facility].    Nettie Vidal RN

## 2024-11-07 NOTE — DISCHARGE INSTRUCTIONS
Warm compresses and static stretches to the sore area.    Use Robaxin as needed for muscle relaxation use prednisone to help with the inflammation and use Voltaren for pain do not mix with Motrin ibuprofen Advil or Winnie Cravenan is a nonnarcotic medication
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
